# Patient Record
Sex: FEMALE
[De-identification: names, ages, dates, MRNs, and addresses within clinical notes are randomized per-mention and may not be internally consistent; named-entity substitution may affect disease eponyms.]

---

## 2021-08-17 ENCOUNTER — DASHBOARD ENCOUNTERS (OUTPATIENT)
Age: 46
End: 2021-08-17

## 2021-08-17 ENCOUNTER — OFFICE VISIT (OUTPATIENT)
Dept: URBAN - METROPOLITAN AREA CLINIC 118 | Facility: CLINIC | Age: 46
End: 2021-08-17
Payer: COMMERCIAL

## 2021-08-17 ENCOUNTER — LAB OUTSIDE AN ENCOUNTER (OUTPATIENT)
Dept: URBAN - METROPOLITAN AREA CLINIC 118 | Facility: CLINIC | Age: 46
End: 2021-08-17

## 2021-08-17 DIAGNOSIS — R14.0 BLOATING SYMPTOM: ICD-10-CM

## 2021-08-17 DIAGNOSIS — Z12.11 SCREEN FOR COLON CANCER: ICD-10-CM

## 2021-08-17 DIAGNOSIS — R19.4 CHANGE IN BOWEL HABITS: ICD-10-CM

## 2021-08-17 PROCEDURE — 99204 OFFICE O/P NEW MOD 45 MIN: CPT | Performed by: INTERNAL MEDICINE

## 2021-08-17 RX ORDER — HYDROCHLOROTHIAZIDE 25 MG/1
1 TABLET IN THE MORNING TABLET ORAL ONCE A DAY
Status: ACTIVE | COMMUNITY

## 2021-08-17 NOTE — HPI-TODAY'S VISIT:
pt s/p appendectomy early this year presents for evaluation. pt concerned for decrease bm form 3 to 1 per day associated w/ gas, bloating mainly at night. Denies diet or med changes. No weight loss or anemia. Pt does feel concerned for abdominal pathology. Denies UGI symptoms including nausea, vomiting, gerd or dysphagia. pt has never had a colonoscopy.

## 2021-10-01 ENCOUNTER — OFFICE VISIT (OUTPATIENT)
Dept: URBAN - METROPOLITAN AREA SURGERY CENTER 23 | Facility: SURGERY CENTER | Age: 46
End: 2021-10-01